# Patient Record
Sex: MALE | Race: BLACK OR AFRICAN AMERICAN | Employment: FULL TIME | URBAN - METROPOLITAN AREA
[De-identification: names, ages, dates, MRNs, and addresses within clinical notes are randomized per-mention and may not be internally consistent; named-entity substitution may affect disease eponyms.]

---

## 2017-11-28 ENCOUNTER — OFFICE VISIT (OUTPATIENT)
Dept: FAMILY MEDICINE CLINIC | Facility: CLINIC | Age: 47
End: 2017-11-28

## 2017-11-28 VITALS
BODY MASS INDEX: 28.34 KG/M2 | WEIGHT: 187 LBS | HEART RATE: 99 BPM | SYSTOLIC BLOOD PRESSURE: 112 MMHG | HEIGHT: 68 IN | OXYGEN SATURATION: 99 % | DIASTOLIC BLOOD PRESSURE: 70 MMHG

## 2017-11-28 DIAGNOSIS — S22.41XA CLOSED FRACTURE OF MULTIPLE RIBS OF RIGHT SIDE, INITIAL ENCOUNTER: ICD-10-CM

## 2017-11-28 DIAGNOSIS — M25.511 ACUTE PAIN OF RIGHT SHOULDER DUE TO TRAUMA: Primary | ICD-10-CM

## 2017-11-28 DIAGNOSIS — G89.11 ACUTE PAIN OF RIGHT SHOULDER DUE TO TRAUMA: Primary | ICD-10-CM

## 2017-11-28 PROCEDURE — 99202 OFFICE O/P NEW SF 15 MIN: CPT | Performed by: FAMILY MEDICINE

## 2017-11-28 RX ORDER — NIFEDIPINE 60 MG/1
TABLET, FILM COATED, EXTENDED RELEASE ORAL
Refills: 0 | COMMUNITY
Start: 2017-11-16 | End: 2018-01-30

## 2017-11-28 RX ORDER — HYDROCODONE BITARTRATE AND ACETAMINOPHEN 5; 325 MG/1; MG/1
TABLET ORAL
Refills: 0 | COMMUNITY
Start: 2017-11-24 | End: 2017-12-15

## 2017-11-28 RX ORDER — LOVASTATIN 40 MG/1
TABLET ORAL
Refills: 0 | COMMUNITY
Start: 2017-11-16 | End: 2018-04-17

## 2017-11-28 RX ORDER — NAPROXEN 500 MG/1
500 TABLET ORAL 2 TIMES DAILY WITH MEALS
Qty: 28 TABLET | Refills: 0 | Status: SHIPPED | OUTPATIENT
Start: 2017-11-28 | End: 2017-12-08 | Stop reason: CLARIF

## 2017-11-28 RX ORDER — POTASSIUM CHLORIDE 750 MG/1
CAPSULE, EXTENDED RELEASE ORAL
Refills: 0 | COMMUNITY
Start: 2017-09-16

## 2017-11-28 RX ORDER — INDOMETHACIN 50 MG/1
CAPSULE ORAL
Refills: 0 | COMMUNITY
Start: 2017-10-31 | End: 2018-02-13 | Stop reason: ALTCHOICE

## 2017-11-28 RX ORDER — ASPIRIN 325 MG
TABLET, DELAYED RELEASE (ENTERIC COATED) ORAL
Refills: 0 | COMMUNITY
Start: 2017-09-14

## 2017-11-28 RX ORDER — ERGOCALCIFEROL 1.25 MG/1
CAPSULE ORAL
Refills: 3 | COMMUNITY
Start: 2017-10-24

## 2017-11-28 RX ORDER — HYDROCHLOROTHIAZIDE 25 MG/1
TABLET ORAL
Refills: 0 | COMMUNITY
Start: 2017-10-24 | End: 2018-01-30

## 2017-11-28 RX ORDER — LISINOPRIL 40 MG/1
TABLET ORAL
Refills: 0 | COMMUNITY
Start: 2017-11-16 | End: 2018-01-30

## 2017-11-28 RX ORDER — ALLOPURINOL 100 MG/1
TABLET ORAL
Refills: 3 | COMMUNITY
Start: 2017-09-12 | End: 2017-12-15
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
## 2017-12-04 ENCOUNTER — HOSPITAL ENCOUNTER (OUTPATIENT)
Dept: MRI IMAGING | Facility: HOSPITAL | Age: 47
Discharge: HOME | End: 2017-12-04
Attending: FAMILY MEDICINE
Payer: MEDICAID

## 2017-12-04 DIAGNOSIS — G89.11 ACUTE PAIN OF RIGHT SHOULDER DUE TO TRAUMA: ICD-10-CM

## 2017-12-04 DIAGNOSIS — M25.511 ACUTE PAIN OF RIGHT SHOULDER DUE TO TRAUMA: ICD-10-CM

## 2017-12-04 PROCEDURE — 73221 MRI JOINT UPR EXTREM W/O DYE: CPT | Performed by: FAMILY MEDICINE

## 2017-12-08 ENCOUNTER — OFFICE VISIT (OUTPATIENT)
Dept: FAMILY MEDICINE CLINIC | Facility: CLINIC | Age: 47
End: 2017-12-08

## 2017-12-08 VITALS
OXYGEN SATURATION: 98 % | DIASTOLIC BLOOD PRESSURE: 84 MMHG | HEIGHT: 68 IN | BODY MASS INDEX: 28.64 KG/M2 | WEIGHT: 189 LBS | SYSTOLIC BLOOD PRESSURE: 132 MMHG | HEART RATE: 89 BPM

## 2017-12-08 DIAGNOSIS — R07.81 RIB PAIN ON RIGHT SIDE: ICD-10-CM

## 2017-12-08 DIAGNOSIS — S46.811D TRAUMATIC TEAR OF SUPRASPINATUS TENDON OF RIGHT SHOULDER, SUBSEQUENT ENCOUNTER: Primary | ICD-10-CM

## 2017-12-08 PROBLEM — S46.811A TRAUMATIC TEAR OF SUPRASPINATUS TENDON OF RIGHT SHOULDER: Status: ACTIVE | Noted: 2017-12-08

## 2017-12-08 PROCEDURE — 99213 OFFICE O/P EST LOW 20 MIN: CPT | Performed by: FAMILY MEDICINE

## 2017-12-08 RX ORDER — METHYLPREDNISOLONE 4 MG/1
TABLET ORAL
Qty: 1 KIT | Refills: 0 | Status: SHIPPED | OUTPATIENT
Start: 2017-12-08 | End: 2018-01-09 | Stop reason: ALTCHOICE

## 2017-12-08 NOTE — PROGRESS NOTES
CC:  Patient presents with: Follow - Up: shoulder and rib pain is still the same      HPI: 55year old male here to follow-up on right rotator cuff tear and right rib pain. Seeing Dr. Gurjit De La Fuente next week on Thursday morning.   Notes his pain is the same as Therapy Pack As directed.  Disp: 1 kit Rfl: 0   HYDROcodone-acetaminophen 5-325 MG Oral Tab TAKE 1-2 TABLETS BY MOUTH EVERY SIX HOURS AS NEEDED FOR PAIN Disp:  Rfl: 0   hydrochlorothiazide 25 MG Oral Tab TK 1 T PO D Disp:  Rfl: 0   ergocalciferol 10471 unit difficulties  - Follow-up pending improvement with Medrol pack    Yogi Parsons DO  12/08/17  11:08 AM

## 2017-12-14 ENCOUNTER — OFFICE VISIT (OUTPATIENT)
Dept: ORTHOPEDICS CLINIC | Facility: CLINIC | Age: 47
End: 2017-12-14

## 2017-12-14 ENCOUNTER — HOSPITAL ENCOUNTER (OUTPATIENT)
Dept: GENERAL RADIOLOGY | Facility: HOSPITAL | Age: 47
Discharge: HOME | End: 2017-12-14
Attending: ORTHOPAEDIC SURGERY
Payer: MEDICAID

## 2017-12-14 VITALS — HEART RATE: 76 BPM | RESPIRATION RATE: 14 BRPM | SYSTOLIC BLOOD PRESSURE: 120 MMHG | DIASTOLIC BLOOD PRESSURE: 82 MMHG

## 2017-12-14 DIAGNOSIS — S43.421A SPRAIN OF RIGHT ROTATOR CUFF CAPSULE, INITIAL ENCOUNTER: Primary | ICD-10-CM

## 2017-12-14 DIAGNOSIS — S43.421A SPRAIN OF RIGHT ROTATOR CUFF CAPSULE, INITIAL ENCOUNTER: ICD-10-CM

## 2017-12-14 DIAGNOSIS — M25.511 ACUTE PAIN OF RIGHT SHOULDER: ICD-10-CM

## 2017-12-14 PROCEDURE — 99212 OFFICE O/P EST SF 10 MIN: CPT | Performed by: ORTHOPAEDIC SURGERY

## 2017-12-14 PROCEDURE — 20610 DRAIN/INJ JOINT/BURSA W/O US: CPT | Performed by: ORTHOPAEDIC SURGERY

## 2017-12-14 PROCEDURE — 73030 X-RAY EXAM OF SHOULDER: CPT | Performed by: ORTHOPAEDIC SURGERY

## 2017-12-14 PROCEDURE — 99203 OFFICE O/P NEW LOW 30 MIN: CPT | Performed by: ORTHOPAEDIC SURGERY
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
## 2017-12-14 NOTE — PROGRESS NOTES
Per verbal order from MARICEL, draw up 8ml 1% lidocaine and 2ml of Kenalog 10 for cortisone injection to right shoulder.  Gail Ac

## 2017-12-15 ENCOUNTER — OFFICE VISIT (OUTPATIENT)
Dept: FAMILY MEDICINE CLINIC | Facility: CLINIC | Age: 47
End: 2017-12-15

## 2017-12-15 VITALS
BODY MASS INDEX: 29 KG/M2 | SYSTOLIC BLOOD PRESSURE: 124 MMHG | DIASTOLIC BLOOD PRESSURE: 80 MMHG | OXYGEN SATURATION: 98 % | HEART RATE: 103 BPM | WEIGHT: 189 LBS

## 2017-12-15 DIAGNOSIS — S43.421D SPRAIN OF RIGHT ROTATOR CUFF CAPSULE, SUBSEQUENT ENCOUNTER: Primary | ICD-10-CM

## 2017-12-15 DIAGNOSIS — R07.81 RIB PAIN ON RIGHT SIDE: ICD-10-CM

## 2017-12-15 PROCEDURE — 99213 OFFICE O/P EST LOW 20 MIN: CPT | Performed by: FAMILY MEDICINE

## 2017-12-15 RX ORDER — HYDROCODONE BITARTRATE AND ACETAMINOPHEN 5; 325 MG/1; MG/1
1 TABLET ORAL EVERY 6 HOURS PRN
Qty: 20 TABLET | Refills: 0 | Status: SHIPPED | OUTPATIENT
Start: 2017-12-15 | End: 2018-01-09 | Stop reason: ALTCHOICE

## 2017-12-15 RX ORDER — ALLOPURINOL 100 MG/1
100 TABLET ORAL 2 TIMES DAILY
Qty: 60 TABLET | Refills: 0 | Status: SHIPPED | OUTPATIENT
Start: 2017-12-15 | End: 2018-01-11

## 2017-12-15 NOTE — PROGRESS NOTES
CC:  Patient presents with: Follow - Up: ortho visit on plan for shoulder pain      HPI: 55year old male here to follow-up on right rotator cuff sprain after orthopedic visit yesterday.   Got a steroid injection into his right shoulder joint yesterday whi MOUTH EVERY SIX HOURS AS NEEDED FOR PAIN Disp:  Rfl: 0   hydrochlorothiazide 25 MG Oral Tab TK 1 T PO D Disp:  Rfl: 0   ergocalciferol 55603 units Oral Cap TK 1 C PO Q WK Disp:  Rfl: 3   aspirin  MG Oral Tab EC TK 1 T PO  D Disp:  Rfl: 0   indomethac determine if rib is indeed fractured  - Warning signs and ED precautions reviewed and patient expressed understanding    El Cameron DO  12/15/17  10:39 AM

## 2017-12-15 NOTE — TELEPHONE ENCOUNTER
Refill request rec'd for Allopurinol 100mg bid #60. Pt was seen today. Per hodan Samuels to refill x 1 & have pharmacy contact physician for further refills. Let pharmacy know and sent in refill.

## 2018-01-09 ENCOUNTER — OFFICE VISIT (OUTPATIENT)
Dept: PHYSICAL THERAPY | Facility: HOSPITAL | Age: 48
End: 2018-01-09
Attending: ORTHOPAEDIC SURGERY
Payer: MEDICAID

## 2018-01-09 ENCOUNTER — TELEPHONE (OUTPATIENT)
Dept: FAMILY MEDICINE CLINIC | Facility: CLINIC | Age: 48
End: 2018-01-09

## 2018-01-09 DIAGNOSIS — M25.511 ACUTE PAIN OF RIGHT SHOULDER: ICD-10-CM

## 2018-01-09 DIAGNOSIS — S43.421A SPRAIN OF RIGHT ROTATOR CUFF CAPSULE, INITIAL ENCOUNTER: ICD-10-CM

## 2018-01-09 PROCEDURE — 97161 PT EVAL LOW COMPLEX 20 MIN: CPT | Performed by: PHYSICAL THERAPIST

## 2018-01-09 RX ORDER — BACLOFEN 10 MG/1
10 TABLET ORAL 3 TIMES DAILY
Qty: 42 TABLET | Refills: 0 | Status: SHIPPED | OUTPATIENT
Start: 2018-01-09 | End: 2018-01-19 | Stop reason: ALTCHOICE

## 2018-01-09 NOTE — TELEPHONE ENCOUNTER
Pt currently taking ibuprofen for back pain pt wants to come in for an appointment but his work schedule and  schedule dont match up.  Pt wants to speak with Dr. Rafia Cagle back

## 2018-01-09 NOTE — PROGRESS NOTES
SHOULDER EVALUATION:   Referring Physician: Dr. Gurjit De La Fuente  Diagnosis: Sprain of right rotator cuff capsule, initial encounter (Z55.781Z)  Acute pain of right shoulder (M25.511)  Onset: Nov 2017    Evaluation Date: 1/9/2018  Visit # 1  Scheduled Visits 8 above 90 degrees. Pt presents to PT with decreased R shoulder AROM, decreased shoulder/scapula strength, poor posture and pain.   Odilia Osgood would benefit from skilled Physical Therapy to address the above impairments in order to reach overhead and myrna/shauna c Ultrasound;  Patient education; Home exercise program instruction; modalities prn     Education or treatment limitation: None  Rehab Potential: good    FOTO: 47% limitation      Patient was advised of these findings, precautions, and treatment options and h
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
## 2018-01-11 RX ORDER — ALLOPURINOL 100 MG/1
TABLET ORAL
Qty: 60 TABLET | Refills: 3 | Status: SHIPPED | OUTPATIENT
Start: 2018-01-11

## 2018-01-12 RX ORDER — ALLOPURINOL 100 MG/1
TABLET ORAL
Qty: 60 TABLET | Refills: 0 | OUTPATIENT
Start: 2018-01-12

## 2018-01-15 ENCOUNTER — OFFICE VISIT (OUTPATIENT)
Dept: PHYSICAL THERAPY | Facility: HOSPITAL | Age: 48
End: 2018-01-15
Attending: ORTHOPAEDIC SURGERY
Payer: MEDICAID

## 2018-01-15 PROCEDURE — 97110 THERAPEUTIC EXERCISES: CPT

## 2018-01-15 NOTE — PROGRESS NOTES
Diagnosis:Sprain of right rotator cuff capsule, initial encounter (W21.024U)  Acute pain of right shoulder (M25.511  Authorized # of Visits:  2/6         Next MD visit: 2/6/18  Fall Risk: standard         Precautions: n/a           Medication Changes since

## 2018-01-18 ENCOUNTER — OFFICE VISIT (OUTPATIENT)
Dept: PHYSICAL THERAPY | Facility: HOSPITAL | Age: 48
End: 2018-01-18
Attending: ORTHOPAEDIC SURGERY
Payer: MEDICAID

## 2018-01-18 ENCOUNTER — TELEPHONE (OUTPATIENT)
Dept: FAMILY MEDICINE CLINIC | Facility: CLINIC | Age: 48
End: 2018-01-18

## 2018-01-18 PROCEDURE — 97110 THERAPEUTIC EXERCISES: CPT | Performed by: PHYSICAL THERAPIST

## 2018-01-18 NOTE — PROGRESS NOTES
Diagnosis:Sprain of right rotator cuff capsule, initial encounter (W09.678H)  Acute pain of right shoulder (M25.511  Authorized # of Visits:  3/6         Next MD visit: 2/6/18  Fall Risk: standard         Precautions: n/a           Medication Changes since

## 2018-01-18 NOTE — TELEPHONE ENCOUNTER
Left  for patient returning his call and told him I would call back tomorrow unless it is an emergency in which he is to call back sooner or go to ED.

## 2018-01-19 RX ORDER — METHYLPREDNISOLONE 4 MG/1
TABLET ORAL
Qty: 1 KIT | Refills: 0 | Status: SHIPPED | OUTPATIENT
Start: 2018-01-19

## 2018-01-22 ENCOUNTER — APPOINTMENT (OUTPATIENT)
Dept: PHYSICAL THERAPY | Facility: HOSPITAL | Age: 48
End: 2018-01-22
Attending: ORTHOPAEDIC SURGERY
Payer: MEDICAID

## 2018-01-22 ENCOUNTER — OFFICE VISIT (OUTPATIENT)
Dept: FAMILY MEDICINE CLINIC | Facility: CLINIC | Age: 48
End: 2018-01-22

## 2018-01-22 VITALS
SYSTOLIC BLOOD PRESSURE: 148 MMHG | DIASTOLIC BLOOD PRESSURE: 82 MMHG | OXYGEN SATURATION: 98 % | BODY MASS INDEX: 30 KG/M2 | HEART RATE: 97 BPM | WEIGHT: 194 LBS

## 2018-01-22 DIAGNOSIS — G47.33 OSA (OBSTRUCTIVE SLEEP APNEA): ICD-10-CM

## 2018-01-22 DIAGNOSIS — M99.02 SOMATIC DYSFUNCTION OF THORACIC REGION: ICD-10-CM

## 2018-01-22 DIAGNOSIS — M99.03 SOMATIC DYSFUNCTION OF LUMBAR REGION: ICD-10-CM

## 2018-01-22 DIAGNOSIS — M54.50 ACUTE BILATERAL LOW BACK PAIN WITHOUT SCIATICA: Primary | ICD-10-CM

## 2018-01-22 DIAGNOSIS — M99.04 SOMATIC DYSFUNCTION OF SACRAL REGION: ICD-10-CM

## 2018-01-22 PROCEDURE — 98926 OSTEOPATH MANJ 3-4 REGIONS: CPT | Performed by: FAMILY MEDICINE

## 2018-01-22 PROCEDURE — 99213 OFFICE O/P EST LOW 20 MIN: CPT | Performed by: FAMILY MEDICINE

## 2018-01-22 NOTE — PROGRESS NOTES
CC:  Patient presents with:  Back Pain      HPI: 52year old male here to discuss bilateral low back pain with no sciatica. Reports bilateral low back pain for past two weeks, notes no radicular symptoms.   Bending over to touch his toes and getting in and FOR 10 DAYS Disp:  Rfl: 0   lisinopril 40 MG Oral Tab TK 1 T PO  QD Disp:  Rfl: 0   Lovastatin 40 MG Oral Tab TK 1 T PO HS Disp:  Rfl: 0   MetFORMIN HCl 500 MG Oral Tab TK 1 T PO BID Disp:  Rfl: 0   NIFEdipine ER 60 MG Oral Tablet 24 Hr TK 1 T PO D Disp:

## 2018-01-22 NOTE — PROCEDURES
Osteopathic Manipulation Procedure Note     Thoracic region:   Soft tissue technique performed to lower thoracic spine with moderate improvement in pain and stiffness reported.     Lumbar region:  Soft tissue and articulatory/rib raising technique performed

## 2018-01-23 ENCOUNTER — TELEPHONE (OUTPATIENT)
Dept: FAMILY MEDICINE CLINIC | Facility: CLINIC | Age: 48
End: 2018-01-23
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
## 2018-01-25 ENCOUNTER — HOSPITAL ENCOUNTER (OUTPATIENT)
Age: 48
Discharge: HOME | End: 2018-01-25
Attending: EMERGENCY MEDICINE
Payer: MEDICAID

## 2018-01-25 ENCOUNTER — APPOINTMENT (OUTPATIENT)
Dept: GENERAL RADIOLOGY | Age: 48
End: 2018-01-25
Attending: EMERGENCY MEDICINE
Payer: MEDICAID

## 2018-01-25 ENCOUNTER — APPOINTMENT (OUTPATIENT)
Dept: PHYSICAL THERAPY | Facility: HOSPITAL | Age: 48
End: 2018-01-25
Attending: ORTHOPAEDIC SURGERY
Payer: MEDICAID

## 2018-01-25 VITALS
TEMPERATURE: 99 F | HEART RATE: 96 BPM | HEIGHT: 68 IN | DIASTOLIC BLOOD PRESSURE: 92 MMHG | SYSTOLIC BLOOD PRESSURE: 160 MMHG | WEIGHT: 179 LBS | BODY MASS INDEX: 27.13 KG/M2 | OXYGEN SATURATION: 100 % | RESPIRATION RATE: 18 BRPM

## 2018-01-25 DIAGNOSIS — S20.219A CONTUSION OF CHEST WALL, UNSPECIFIED LATERALITY, INITIAL ENCOUNTER: Primary | ICD-10-CM

## 2018-01-25 PROCEDURE — 71046 X-RAY EXAM CHEST 2 VIEWS: CPT | Performed by: EMERGENCY MEDICINE

## 2018-01-25 PROCEDURE — 99203 OFFICE O/P NEW LOW 30 MIN: CPT

## 2018-01-25 PROCEDURE — 71110 X-RAY EXAM RIBS BIL 3 VIEWS: CPT | Performed by: EMERGENCY MEDICINE

## 2018-01-25 PROCEDURE — 99213 OFFICE O/P EST LOW 20 MIN: CPT
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
## 2018-01-25 NOTE — ED PROVIDER NOTES
Patient Seen in: [address]    History   Patient presents with:  Musculoskeletal Problem    Stated Complaint: rib pain    HPI    51-year-old male patient presents her complaining of left-sided chest wall pain associate harrison Regular rate and rhythm  Skin: No rashes or lesions. Bruising as above. Musculoskeletal: Significant for Palm sized bruising noted on the left upper trunk. Nontender to palpation. No obvious deformity or swelling. No crepitance.   Neuro: Normal speech,

## 2018-01-30 ENCOUNTER — TELEPHONE (OUTPATIENT)
Dept: FAMILY MEDICINE CLINIC | Facility: CLINIC | Age: 48
End: 2018-01-30

## 2018-01-30 RX ORDER — HYDROCHLOROTHIAZIDE 25 MG/1
TABLET ORAL
Qty: 90 TABLET | Refills: 0 | Status: SHIPPED | OUTPATIENT
Start: 2018-01-30

## 2018-01-30 RX ORDER — LISINOPRIL 40 MG/1
TABLET ORAL
Qty: 90 TABLET | Refills: 0 | Status: SHIPPED | OUTPATIENT
Start: 2018-01-30 | End: 2018-04-17

## 2018-01-30 RX ORDER — NIFEDIPINE 60 MG/1
TABLET, FILM COATED, EXTENDED RELEASE ORAL
Qty: 90 TABLET | Refills: 0 | Status: SHIPPED | OUTPATIENT
Start: 2018-01-30 | End: 2018-04-17

## 2018-01-30 NOTE — TELEPHONE ENCOUNTER
Called patient back to check which medications he needed refills on and he told me he's not to check with the pharmacy. Called pharmacy and left a message to check which medications need a refill, will wait for call back.

## 2018-01-31 ENCOUNTER — TELEPHONE (OUTPATIENT)
Dept: FAMILY MEDICINE CLINIC | Facility: CLINIC | Age: 48
End: 2018-01-31

## 2018-01-31 NOTE — TELEPHONE ENCOUNTER
Authorized 90 day supply of KCl 10 meq daily. Will need blood work and physical prior to next refill.

## 2018-01-31 NOTE — TELEPHONE ENCOUNTER
Left message stating we have yet to receive his sleep study results, to call back office to let me know where he had it done

## 2018-02-05 ENCOUNTER — TELEPHONE (OUTPATIENT)
Dept: FAMILY MEDICINE CLINIC | Facility: CLINIC | Age: 48
End: 2018-02-05

## 2018-02-05 NOTE — TELEPHONE ENCOUNTER
Gerald calling requesting a note stating he is capable of working from home 02/06- 02/12.  Pt's sister will  note tomorrow

## 2018-02-13 ENCOUNTER — OFFICE VISIT (OUTPATIENT)
Dept: FAMILY MEDICINE CLINIC | Facility: CLINIC | Age: 48
End: 2018-02-13

## 2018-02-13 ENCOUNTER — OFFICE VISIT (OUTPATIENT)
Dept: PHYSICAL THERAPY | Facility: HOSPITAL | Age: 48
End: 2018-02-13
Attending: ORTHOPAEDIC SURGERY
Payer: MEDICAID

## 2018-02-13 VITALS
WEIGHT: 197 LBS | BODY MASS INDEX: 30 KG/M2 | SYSTOLIC BLOOD PRESSURE: 148 MMHG | HEART RATE: 100 BPM | OXYGEN SATURATION: 98 % | DIASTOLIC BLOOD PRESSURE: 70 MMHG

## 2018-02-13 DIAGNOSIS — I10 HYPERTENSION WITH GOAL BLOOD PRESSURE LESS THAN 140/90: ICD-10-CM

## 2018-02-13 DIAGNOSIS — G47.33 OBSTRUCTIVE SLEEP APNEA: ICD-10-CM

## 2018-02-13 DIAGNOSIS — S43.421D SPRAIN OF RIGHT ROTATOR CUFF CAPSULE, SUBSEQUENT ENCOUNTER: Primary | ICD-10-CM

## 2018-02-13 PROBLEM — M25.511 ACUTE PAIN OF RIGHT SHOULDER: Status: RESOLVED | Noted: 2017-12-14 | Resolved: 2018-02-13

## 2018-02-13 PROBLEM — R07.81 RIB PAIN ON RIGHT SIDE: Status: RESOLVED | Noted: 2017-12-08 | Resolved: 2018-02-13

## 2018-02-13 PROCEDURE — 97110 THERAPEUTIC EXERCISES: CPT | Performed by: PHYSICAL THERAPIST

## 2018-02-13 PROCEDURE — 99213 OFFICE O/P EST LOW 20 MIN: CPT | Performed by: FAMILY MEDICINE
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
## 2018-02-17 ENCOUNTER — OFFICE VISIT (OUTPATIENT)
Dept: SLEEP CENTER | Age: 48
End: 2018-02-17
Attending: FAMILY MEDICINE
Payer: MEDICAID

## 2018-02-17 DIAGNOSIS — Z76.89 SLEEP CONCERN: Primary | ICD-10-CM

## 2018-02-17 PROCEDURE — 95811 POLYSOM 6/>YRS CPAP 4/> PARM: CPT

## 2018-02-21 DIAGNOSIS — G47.33 OSA (OBSTRUCTIVE SLEEP APNEA): Primary | ICD-10-CM

## 2018-02-21 NOTE — PROCEDURES
[facility]  Accredited by the [employer] ([employer])    PATIENT'S NAME: Crescencio Collins   ATTENDING PHYSICIAN: Nilda Duong. Lisseth Ybarra DO   REFERRING PHYSICIAN: Nilda Duong.  Lisseth Ybarra DO   PATIENT ACCOUNT #: [de-identified] LOCATION: Sle arousal index is 18.4 events per hour and the spontaneous arousal index is 25.6 events per hour for a combined arousal index of 44 events per hour. There were no significant periodic limb movements. The lowest desaturation was to 76%.   The average heart

## 2018-02-27 ENCOUNTER — TELEPHONE (OUTPATIENT)
Dept: FAMILY MEDICINE CLINIC | Facility: CLINIC | Age: 48
End: 2018-02-27

## 2018-02-27 NOTE — TELEPHONE ENCOUNTER
Msg rec'd from pt requesting an alternate sleep center as [address] [place] [address] does not have an opening for him until 3/19/18. Per Dr. Delphine Hunter, pt can see Dr. Ramos Postal in OP at [phone].  Informed pt and let him know that he needs to make sure that Dr. Rachel Shelley is covered unde

## 2018-02-27 NOTE — TELEPHONE ENCOUNTER
Would like to know if you can find him a new sleep center to go to. The one in [facility] you gave him has no openings until March 19.   Please call him

## 2018-03-08 ENCOUNTER — TELEPHONE (OUTPATIENT)
Dept: FAMILY MEDICINE CLINIC | Facility: CLINIC | Age: 48
End: 2018-03-08

## 2018-03-08 NOTE — TELEPHONE ENCOUNTER
Gena Gleason calling to follow up on Gerald's sleep study. Notices it was denied by insurance but wondering if you want to order an order cpap from insurance with consult to pulmonology.

## 2018-03-21 ENCOUNTER — TELEPHONE (OUTPATIENT)
Dept: FAMILY MEDICINE CLINIC | Facility: CLINIC | Age: 48
End: 2018-03-21

## 2018-04-13 RX ORDER — NAPROXEN 500 MG/1
TABLET ORAL
Qty: 28 TABLET | Refills: 0 | Status: SHIPPED | OUTPATIENT
Start: 2018-04-13

## 2018-04-17 RX ORDER — LOVASTATIN 40 MG/1
40 TABLET ORAL NIGHTLY
Qty: 90 TABLET | Refills: 0 | Status: SHIPPED | OUTPATIENT
Start: 2018-04-17

## 2018-04-17 RX ORDER — NIFEDIPINE 60 MG/1
60 TABLET, FILM COATED, EXTENDED RELEASE ORAL
Qty: 90 TABLET | Refills: 0 | Status: SHIPPED | OUTPATIENT
Start: 2018-04-17

## 2018-04-17 RX ORDER — LISINOPRIL 40 MG/1
40 TABLET ORAL
Qty: 90 TABLET | Refills: 0 | Status: SHIPPED | OUTPATIENT
Start: 2018-04-17

## 2018-04-17 NOTE — TELEPHONE ENCOUNTER
Per Dr. Yuri Herrera, ok to refill pts Nifedipine, Lisinopril, Lovastatin and Metformin for 90 day supply of each. Sent in scripts to pts pharmacy. Pt does need to come in for a physical to discuss meds and have bld work done.  LM for pt to contact office to reinau

## 2022-04-05 NOTE — PROGRESS NOTES
Patient Name: Isabell Hanson, : 1970, MRN: [id number]   Date:  2018  Referring Physician:  Claudia Chi    Diagnosis: Sprain of right rotator cuff capsule, initial encounter (D23.158A)  Acute pain of right shoulder (M25.511    Dischar Changes since last visit?: No  Subjective: see discharge note    Pain 0/10  Objective:      Therapeutic Exercises:  Reassessed for discharge  Discussed HEP    HEP:      Assessment: see discharge summary            Plan: See discharge summary        Charges: Detail Level: Detailed Additional Notes: Pt received covid vaccines + booster Quality 130: Documentation Of Current Medications In The Medical Record: Current Medications Documented Quality 110: Preventive Care And Screening: Influenza Immunization: Influenza immunization was not ordered or administered, reason not given

## (undated) NOTE — LETTER
Date: 11/28/2017    Patient Name: Jose Luis Velasquez          To Whom it may concern: This letter has been written at the patient's request. The above patient was seen at the [facility] for treatment of a medical condition.     This patient s

## (undated) NOTE — LETTER
Date: 2/13/2018    Patient Name: Cyn Dean          To Whom it may concern: This letter has been written at the patient's request. The above patient was seen at the [facility] for treatment of a medical condition.     The patient m

## (undated) NOTE — LETTER
Date: 12/15/2017    Patient Name: Josey Sexton          To Whom it may concern: This letter has been written at the patient's request. The above patient was seen at the [facility] for treatment of a medical condition.     This patient

## (undated) NOTE — LETTER
Date: 2/13/2018    Patient Name: Tone Santoyo          To Whom it may concern: This letter has been written at the patient's request. The above patient was seen at the [facility] for treatment of a medical condition.       The patient

## (undated) NOTE — LETTER
Date: 2/5/2018    Patient Name: Dara Arana          To Whom it may concern: This letter has been written at the patient's request. The above patient was seen at the [facility] for treatment of a medical condition.     This patient s

## (undated) NOTE — LETTER
Dear Dr. Liat Ewing    This letter is to inform you that Annabella Young has been attending Physical Therapy with me. See below for my most recent plan of care.        Patient Name: Annabella Young, : 1970, MRN: [id number]   Date:

## (undated) NOTE — LETTER
Date: 12/8/2017    Patient Name: Zhou Jackman          To Whom it may concern: This letter has been written at the patient's request. The above patient was seen at the [facility] for treatment of a medical condition.     This patient sh

## (undated) NOTE — LETTER
Date: 12/8/2017    Patient Name: Maxx Layne          To Whom it may concern: This letter has been written at the patient's request. The above patient was seen at the [facility] for treatment of a medical condition.     This patient sh